# Patient Record
Sex: FEMALE | Race: WHITE | NOT HISPANIC OR LATINO | Employment: STUDENT | URBAN - NONMETROPOLITAN AREA
[De-identification: names, ages, dates, MRNs, and addresses within clinical notes are randomized per-mention and may not be internally consistent; named-entity substitution may affect disease eponyms.]

---
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
## 2022-01-19 DIAGNOSIS — E10.9 TYPE 1 DIABETES MELLITUS WITHOUT COMPLICATION: ICD-10-CM

## 2022-03-16 ENCOUNTER — DOCUMENTATION (OUTPATIENT)
Dept: ENDOCRINOLOGY | Facility: CLINIC | Age: 22
End: 2022-03-16

## 2022-04-28 ENCOUNTER — LAB (OUTPATIENT)
Dept: LAB | Facility: HOSPITAL | Age: 22
End: 2022-04-28

## 2022-04-28 ENCOUNTER — OFFICE VISIT (OUTPATIENT)
Dept: ENDOCRINOLOGY | Facility: CLINIC | Age: 22
End: 2022-04-28

## 2022-04-28 VITALS
BODY MASS INDEX: 27.29 KG/M2 | DIASTOLIC BLOOD PRESSURE: 66 MMHG | SYSTOLIC BLOOD PRESSURE: 112 MMHG | WEIGHT: 173.9 LBS | HEIGHT: 67 IN | HEART RATE: 78 BPM | OXYGEN SATURATION: 98 %

## 2022-04-28 DIAGNOSIS — E55.9 VITAMIN D DEFICIENCY: ICD-10-CM

## 2022-04-28 DIAGNOSIS — E06.3 HASHIMOTO'S DISEASE: Primary | ICD-10-CM

## 2022-04-28 DIAGNOSIS — E10.65 TYPE 1 DIABETES MELLITUS WITH HYPERGLYCEMIA: ICD-10-CM

## 2022-04-28 LAB
ALBUMIN SERPL-MCNC: 4.1 G/DL (ref 3.5–5.2)
ALBUMIN/GLOB SERPL: 1.7 G/DL
ALP SERPL-CCNC: 64 U/L (ref 39–117)
ALT SERPL W P-5'-P-CCNC: 8 U/L (ref 1–33)
ANION GAP SERPL CALCULATED.3IONS-SCNC: 11 MMOL/L (ref 5–15)
AST SERPL-CCNC: 14 U/L (ref 1–32)
BASOPHILS # BLD AUTO: 0.04 10*3/MM3 (ref 0–0.2)
BASOPHILS NFR BLD AUTO: 0.5 % (ref 0–1.5)
BILIRUB SERPL-MCNC: 0.3 MG/DL (ref 0–1.2)
BUN SERPL-MCNC: 7 MG/DL (ref 6–20)
BUN/CREAT SERPL: 8.2 (ref 7–25)
CALCIUM SPEC-SCNC: 9 MG/DL (ref 8.6–10.5)
CHLORIDE SERPL-SCNC: 101 MMOL/L (ref 98–107)
CHOLEST SERPL-MCNC: 132 MG/DL (ref 0–200)
CO2 SERPL-SCNC: 24 MMOL/L (ref 22–29)
CREAT SERPL-MCNC: 0.85 MG/DL (ref 0.57–1)
DEPRECATED RDW RBC AUTO: 39.8 FL (ref 37–54)
EGFRCR SERPLBLD CKD-EPI 2021: 101.4 ML/MIN/1.73
EOSINOPHIL # BLD AUTO: 0.06 10*3/MM3 (ref 0–0.4)
EOSINOPHIL NFR BLD AUTO: 0.8 % (ref 0.3–6.2)
ERYTHROCYTE [DISTWIDTH] IN BLOOD BY AUTOMATED COUNT: 11.9 % (ref 12.3–15.4)
GLOBULIN UR ELPH-MCNC: 2.4 GM/DL
GLUCOSE SERPL-MCNC: 248 MG/DL (ref 65–99)
HCT VFR BLD AUTO: 39.5 % (ref 34–46.6)
HDLC SERPL-MCNC: 35 MG/DL (ref 40–60)
HGB BLD-MCNC: 13.5 G/DL (ref 12–15.9)
IMM GRANULOCYTES # BLD AUTO: 0.03 10*3/MM3 (ref 0–0.05)
IMM GRANULOCYTES NFR BLD AUTO: 0.4 % (ref 0–0.5)
LDLC SERPL CALC-MCNC: 82 MG/DL (ref 0–100)
LDLC/HDLC SERPL: 2.35 {RATIO}
LYMPHOCYTES # BLD AUTO: 1.9 10*3/MM3 (ref 0.7–3.1)
LYMPHOCYTES NFR BLD AUTO: 25.2 % (ref 19.6–45.3)
MCH RBC QN AUTO: 31.2 PG (ref 26.6–33)
MCHC RBC AUTO-ENTMCNC: 34.2 G/DL (ref 31.5–35.7)
MCV RBC AUTO: 91.2 FL (ref 79–97)
MONOCYTES # BLD AUTO: 0.48 10*3/MM3 (ref 0.1–0.9)
MONOCYTES NFR BLD AUTO: 6.4 % (ref 5–12)
NEUTROPHILS NFR BLD AUTO: 5.04 10*3/MM3 (ref 1.7–7)
NEUTROPHILS NFR BLD AUTO: 66.7 % (ref 42.7–76)
NRBC BLD AUTO-RTO: 0 /100 WBC (ref 0–0.2)
PLATELET # BLD AUTO: 259 10*3/MM3 (ref 140–450)
PMV BLD AUTO: 10.8 FL (ref 6–12)
POTASSIUM SERPL-SCNC: 4 MMOL/L (ref 3.5–5.2)
PROT SERPL-MCNC: 6.5 G/DL (ref 6–8.5)
RBC # BLD AUTO: 4.33 10*6/MM3 (ref 3.77–5.28)
SODIUM SERPL-SCNC: 136 MMOL/L (ref 136–145)
TRIGL SERPL-MCNC: 73 MG/DL (ref 0–150)
TSH SERPL DL<=0.05 MIU/L-ACNC: 1.39 UIU/ML (ref 0.27–4.2)
VLDLC SERPL-MCNC: 15 MG/DL (ref 5–40)
WBC NRBC COR # BLD: 7.55 10*3/MM3 (ref 3.4–10.8)

## 2022-04-28 PROCEDURE — 36415 COLL VENOUS BLD VENIPUNCTURE: CPT | Performed by: NURSE PRACTITIONER

## 2022-04-28 PROCEDURE — 80050 GENERAL HEALTH PANEL: CPT | Performed by: NURSE PRACTITIONER

## 2022-04-28 PROCEDURE — 82306 VITAMIN D 25 HYDROXY: CPT | Performed by: NURSE PRACTITIONER

## 2022-04-28 PROCEDURE — 80061 LIPID PANEL: CPT | Performed by: NURSE PRACTITIONER

## 2022-04-28 PROCEDURE — 95251 CONT GLUC MNTR ANALYSIS I&R: CPT | Performed by: NURSE PRACTITIONER

## 2022-04-28 PROCEDURE — 82607 VITAMIN B-12: CPT | Performed by: NURSE PRACTITIONER

## 2022-04-28 PROCEDURE — 83036 HEMOGLOBIN GLYCOSYLATED A1C: CPT | Performed by: NURSE PRACTITIONER

## 2022-04-28 PROCEDURE — 99214 OFFICE O/P EST MOD 30 MIN: CPT | Performed by: NURSE PRACTITIONER

## 2022-04-28 RX ORDER — GLUCAGON 3 MG/1
1 POWDER NASAL AS NEEDED
Qty: 2 EACH | Refills: 11 | Status: SHIPPED | OUTPATIENT
Start: 2022-04-28

## 2022-04-28 RX ORDER — FLURBIPROFEN SODIUM 0.3 MG/ML
SOLUTION/ DROPS OPHTHALMIC
Qty: 120 EACH | Refills: 11 | Status: SHIPPED | OUTPATIENT
Start: 2022-04-28

## 2022-04-28 NOTE — PROGRESS NOTES
"Chief Complaint  Diabetes (Follow up )    Subjective          Argenis Mcintyre presents to [facility] ENDOCRINOLOGY  History of Present Illness     In office visit     21 year old female presents for follow up      Reason diabetes mellitus type 1     Diagnosed at age 11      Timing constant      Quality not controlled     Severity moderate         Complications --- hyperglycemia      Current regimen      Using tandem with dexcom -- basal IQ          States having lows early morning     This am low       Review of Systems - General ROS: negative             Objective   Vital Signs:   /66   Pulse 78   Ht 170.2 cm (67\")   Wt 78.9 kg (173 lb 14.4 oz)   SpO2 98%   BMI 27.24 kg/m²     Physical Exam  Constitutional:       Appearance: Normal appearance.   Cardiovascular:      Rate and Rhythm: Regular rhythm.      Heart sounds: Normal heart sounds.   Pulmonary:      Breath sounds: Normal breath sounds.   Musculoskeletal:         General: Normal range of motion.      Cervical back: Normal range of motion.   Neurological:      Mental Status: She is alert.        Result Review :   The following data was reviewed by: TARIK Lockett on 04/28/2022:  Common labs    Common Labsle 6/19/21 4/28/22 4/28/22 4/28/22     1421 1421 1421   Glucose   248 (A)    Glucose 95      BUN 8  7    Creatinine 0.7  0.85    eGFR African Am 132      Sodium 136  136    Potassium 3.6  4.0    Chloride 101  101    Calcium 9.9  9.0    Albumin 4.7  4.10    Total Bilirubin 0.43  0.3    Alkaline Phosphatase 71  64    AST (SGOT) 13  14    ALT (SGPT) 10  8    WBC  7.55     Hemoglobin  13.5     Hematocrit  39.5     Platelets  259     Total Cholesterol    132   Triglycerides    73   HDL Cholesterol    35 (A)   LDL Cholesterol     82   (A) Abnormal value       Comments are available for some flowsheets but are not being displayed.                     Assessment and Plan    Diagnoses and all orders for this visit:    1. " Hashimoto's disease (Primary)    2. Type 1 diabetes mellitus with hyperglycemia (HCC)  -     insulin lispro (HumaLOG) 100 UNIT/ML injection;  UNITS THRU PUMP EVERY DAY  Dispense: 50 mL; Refill: 3  -     Glucagon (Baqsimi One Pack) 3 MG/DOSE powder; 1 each into the nostril(s) as directed by provider As Needed (Hypoglycemia). Apply intranasal if hypoglycemia  Dispense: 2 each; Refill: 11  -     CBC & Differential  -     Comprehensive Metabolic Panel  -     Hemoglobin A1c  -     Lipid Panel  -     TSH  -     Vitamin D 25 Hydroxy  -     Vitamin B12  -     CBC & Differential; Future  -     Comprehensive Metabolic Panel; Future  -     Hemoglobin A1c; Future  -     Microalbumin / Creatinine Urine Ratio - Urine, Clean Catch; Future  -     TSH; Future  -     Vitamin B12; Future  -     Vitamin D 25 Hydroxy; Future    3. Vitamin D deficiency    Other orders  -     glucose blood test strip; Testing 4 times daily  Dispense: 120 each; Refill: 11  -     Insulin Pen Needle (B-D UF III MINI PEN NEEDLES) 31G X 5 MM misc; Use 4 times daily  , ICD10 code is E11.9  Dispense: 120 each; Refill: 11           Glycemic Management:     Lab Results   Component Value Date    HGBA1C 7.24 (H) 04/13/2021            Tandem and Dexcom G6 -- basal IQ     Needs control IQ     Downloaded and reviewed     Dated from April 15 to April 28, 2022      Average 293        Did not show time in target     She is having lows over night     Decrease basal               BASAL               MN -2.6  - decrease to 2.55  8 am 2.8  Noon -- 2.9 -- decrease to 2.85      Carb ratio     3      Correction      25         Target            Microvascular Complication Monitoring            Eye exam within 1 year , no DRCharity     No nephropathy           Vit D Def      Taking calcium plus vitamin d with supper        Hashimoto's      TPO ab positive     ethyroid w/o treatment        Lab Results   Component Value Date    TSH 1.390 04/28/2022                      Follow  Up   Return in about 6 months (around 10/28/2022) for Recheck.  Patient was given instructions and counseling regarding her condition or for health maintenance advice. Please see specific information pulled into the AVS if appropriate.         This document has been electronically signed by TARIK Lockett on April 28, 2022 16:51 CDT.

## 2022-04-29 LAB
25(OH)D3 SERPL-MCNC: 23.4 NG/ML (ref 30–100)
HBA1C MFR BLD: 7.9 % (ref 4.8–5.6)
VIT B12 BLD-MCNC: 200 PG/ML (ref 211–946)

## 2022-05-18 ENCOUNTER — DOCUMENTATION (OUTPATIENT)
Dept: ENDOCRINOLOGY | Facility: CLINIC | Age: 22
End: 2022-05-18

## 2022-05-18 NOTE — PROGRESS NOTES
SIGN PROGRESS NOTE AND CHART NOTES SENT TO [facility]     CONFIRMATION RECEIVED     SENT TO

## 2022-09-12 ENCOUNTER — DOCUMENTATION (OUTPATIENT)
Dept: ENDOCRINOLOGY | Facility: CLINIC | Age: 22
End: 2022-09-12

## 2022-10-27 ENCOUNTER — TELEPHONE (OUTPATIENT)
Dept: ENDOCRINOLOGY | Facility: CLINIC | Age: 22
End: 2022-10-27

## 2022-12-06 ENCOUNTER — DOCUMENTATION (OUTPATIENT)
Dept: ENDOCRINOLOGY | Facility: CLINIC | Age: 22
End: 2022-12-06

## 2023-09-13 ENCOUNTER — DOCUMENTATION (OUTPATIENT)
Dept: ENDOCRINOLOGY | Facility: CLINIC | Age: 23
End: 2023-09-13
Payer: COMMERCIAL

## 2025-04-24 NOTE — TELEPHONE ENCOUNTER
----- Message from TARIK Lockett sent at 1/16/2019  1:14 PM CST -----  We looked at the download -- we feel it is still a mealtime problem she is spiking aftermeals and staying high all night and one morning she even crashed --- if she is truly counting the carbs and giving insulin for the food then the carb ratio at supper needs to be 4 -- if she is not being the most faithful in mealtime than leave it the same and please count the carbs and bolus accordingly     temo  ----- Message -----  From: Lauren Galvan MA  Sent: 1/16/2019   8:42 AM  To: TARIK Lockett    Mother says she is running from 250-300 waking up.  Wants to know if you can look at her dexcom and see the numbers?      [Time Spent: ___ minutes] : I have spent [unfilled] minutes of time on the encounter which excludes teaching and separately reported services.